# Patient Record
Sex: FEMALE | Race: WHITE | NOT HISPANIC OR LATINO | Employment: UNEMPLOYED | URBAN - METROPOLITAN AREA
[De-identification: names, ages, dates, MRNs, and addresses within clinical notes are randomized per-mention and may not be internally consistent; named-entity substitution may affect disease eponyms.]

---

## 2017-01-03 ENCOUNTER — OFFICE VISIT (OUTPATIENT)
Dept: MATERNAL FETAL MEDICINE | Facility: CLINIC | Age: 32
End: 2017-01-03
Payer: COMMERCIAL

## 2017-01-03 DIAGNOSIS — Z36.89 ENCOUNTER FOR ULTRASOUND TO CHECK FETAL GROWTH: ICD-10-CM

## 2017-01-03 PROCEDURE — 99499 UNLISTED E&M SERVICE: CPT | Mod: S$GLB,,, | Performed by: OBSTETRICS & GYNECOLOGY

## 2017-01-03 PROCEDURE — 76811 OB US DETAILED SNGL FETUS: CPT | Mod: S$GLB,,, | Performed by: OBSTETRICS & GYNECOLOGY

## 2017-01-03 NOTE — PROGRESS NOTES
Indication: follow up cosult: follow up growth (Dr. Patricia Silver).   Needs seq 2  hx of demise at 26w1d (11/2015).   ____________________________________________________________________________  History: Age: 31 years.  ____________________________________________________________________________  Dating:  Stated EDC:  EDC: 06/14/17 GA by stated EDC: 16w6d  Current Scan on: 01/03/17 EDC: 06/11/17 GA by current scan: 17w2d  Best Overall Assessment: 11/28/16 EDC: 06/14/17 Assessed GA: 16w6d  The calculation of the gestational age by current scan was based on BPD, OFD, HC, TCD, AC, FL and HUM.  The Best Overall Assessment is based on the stated EDC.  ____________________________________________________________________________  General Evaluation:  Fetal heart activity: present. Fetal heart rate: 156 bpm.   Presentation: cephalic.   Fetal movement: visible.   Amniotic Fluid: normal.   Cord: 3 vessels.   Placenta: posterior.     ____________________________________________________________________________  Growth Scan:  Zouna gestation.  Biometry:  BPD 37.0 mm 70th% 17w2d (16w6d to 17w5d)  .2 mm 70th% 17w3d (16w2d to 18w4d)  .7 mm 66th% 17w2d (16w5d to 17w6d)  FL 24.5 mm 67th% 17w3d (16w0d to 18w5d)  OFD 50.5 mm 88th% 17w4d  TCD 16.4 mm 51st% 16w6d  HUM 22.0 mm 54th% 17w0d  RLV 5.5 mm  CM 3.5 mm 27th%  NUCHAL FOLD 3.33 mm  EFW (lbs/oz) 0 lbs 7 ozs  EFW (g) 192 g  75th%       Fetal Anatomy:  Visualized with normal appearance: head, brain, neck, skin, chest, abdominal wall, gastrointestinal tract, kidneys, bladder.    Face: profile normal, nose normal, lip normal.  Spine: Sub-optimal  Heart: Visualized and normal appearance: four-chamber view, left outflow tract.   Angle: to the fetal left. Four-chamber view: septum is sub-opt. Left outflow tract: appears normal for this early EGA. Right outflow tract: sub-opt. Aortic arch: Not ascertained.  Extremities: 4 limbs. Plantar surface of the feet wnl, both hands  seen closed.    Summary of Ultrasound Findings:  Transabdominal US. U/S machine: PhilipsEPIQ 7W. U/S view: limited by fetal position, limited by fetal movements.     ____________________________________________________________________________  Consultation:  FUV: Prior 26w IUFD - TeleMed visit (Dr. Silver)    31  JAIMEE 19; 16w6d    Prenatal record and OB Hx reviewed  Dr. Torrez's last note reviewed  Ultrasound done  Maternal serum screen part I wnl  Interval pregnancy problems - none  No leaking, bleeding or abnormal discharge    EPIC reviewed    OB HX  10/2015 - 27w0d with IUFD at 26 weeks gestation.    She had a IUGR fetus which on autopsy and placental pathology documented advanced chorioamnionitis and fetal stress as evidenced by the presence of nucleated RBCs.   All cultures and genetic studies did not grow. The responsible organism was not identified.    ____________________________________________________________________________  Maternal Structures:  Cervical length 34.1 mm.  ____________________________________________________________________________  Report Summary:  Impression:   Normal fetal growth,   Fetal sono difficult secondary to early gestational age and fetal positioning,   No obvious fetal abnormalities,   Normal Amniotic fluid volume,   Normal placental location,   Normal cervical length.     Recommendations:   Sequential screen  Repeat sono at intervals per Dr. Torrez's note

## 2017-01-05 ENCOUNTER — TELEPHONE (OUTPATIENT)
Dept: MATERNAL FETAL MEDICINE | Facility: CLINIC | Age: 32
End: 2017-01-05

## 2017-01-05 NOTE — TELEPHONE ENCOUNTER
Patient was notified of Negative 2nd Trimester Screen for Down Syndrome,Trisomy 18 and ONTD and verbalized her understanding.

## 2017-01-31 ENCOUNTER — OFFICE VISIT (OUTPATIENT)
Dept: MATERNAL FETAL MEDICINE | Facility: CLINIC | Age: 32
End: 2017-01-31
Payer: COMMERCIAL

## 2017-01-31 VITALS — BODY MASS INDEX: 33.78 KG/M2 | WEIGHT: 203 LBS | DIASTOLIC BLOOD PRESSURE: 80 MMHG | SYSTOLIC BLOOD PRESSURE: 121 MMHG

## 2017-01-31 DIAGNOSIS — Z36.89 ENCOUNTER FOR FETAL ANATOMIC SURVEY: ICD-10-CM

## 2017-01-31 DIAGNOSIS — Z36.89 ENCOUNTER FOR ULTRASOUND TO CHECK FETAL GROWTH: Primary | ICD-10-CM

## 2017-01-31 PROCEDURE — 76811 OB US DETAILED SNGL FETUS: CPT | Mod: S$GLB,,, | Performed by: OBSTETRICS & GYNECOLOGY

## 2017-01-31 PROCEDURE — 99499 UNLISTED E&M SERVICE: CPT | Mod: S$GLB,,, | Performed by: OBSTETRICS & GYNECOLOGY

## 2017-01-31 NOTE — PROGRESS NOTES
A detailed fetal anatomical ultrasound was completed today.  See official report in the imaging section of [facility].  Ultrasound noted no obvious fetal abnormalities.  Ultrasound findings consistent with established dating.  Rescan as clinically indicated.

## 2017-02-27 ENCOUNTER — OFFICE VISIT (OUTPATIENT)
Dept: MATERNAL FETAL MEDICINE | Facility: CLINIC | Age: 32
End: 2017-02-27
Payer: COMMERCIAL

## 2017-02-27 DIAGNOSIS — Z36.89 ENCOUNTER FOR ULTRASOUND TO CHECK FETAL GROWTH: ICD-10-CM

## 2017-02-27 PROCEDURE — 99499 UNLISTED E&M SERVICE: CPT | Mod: S$GLB,,, | Performed by: OBSTETRICS & GYNECOLOGY

## 2017-02-27 PROCEDURE — 76816 OB US FOLLOW-UP PER FETUS: CPT | Mod: S$GLB,,, | Performed by: OBSTETRICS & GYNECOLOGY

## 2017-03-28 ENCOUNTER — OFFICE VISIT (OUTPATIENT)
Dept: MATERNAL FETAL MEDICINE | Facility: CLINIC | Age: 32
End: 2017-03-28
Payer: COMMERCIAL

## 2017-03-28 DIAGNOSIS — Z36.89 ENCOUNTER FOR ULTRASOUND TO CHECK FETAL GROWTH: ICD-10-CM

## 2017-03-28 PROCEDURE — 76819 FETAL BIOPHYS PROFIL W/O NST: CPT | Mod: S$GLB,,, | Performed by: OBSTETRICS & GYNECOLOGY

## 2017-03-28 PROCEDURE — 76815 OB US LIMITED FETUS(S): CPT | Mod: S$GLB,,, | Performed by: OBSTETRICS & GYNECOLOGY

## 2017-03-28 PROCEDURE — 99499 UNLISTED E&M SERVICE: CPT | Mod: S$GLB,,, | Performed by: OBSTETRICS & GYNECOLOGY

## 2017-04-25 ENCOUNTER — OFFICE VISIT (OUTPATIENT)
Dept: MATERNAL FETAL MEDICINE | Facility: CLINIC | Age: 32
End: 2017-04-25
Payer: COMMERCIAL

## 2017-04-25 DIAGNOSIS — Z36.89 ENCOUNTER FOR ULTRASOUND TO CHECK FETAL GROWTH: ICD-10-CM

## 2017-04-25 PROCEDURE — 99499 UNLISTED E&M SERVICE: CPT | Mod: S$GLB,,, | Performed by: OBSTETRICS & GYNECOLOGY

## 2017-04-25 PROCEDURE — 76816 OB US FOLLOW-UP PER FETUS: CPT | Mod: S$GLB,,, | Performed by: OBSTETRICS & GYNECOLOGY

## 2017-04-25 PROCEDURE — 76819 FETAL BIOPHYS PROFIL W/O NST: CPT | Mod: S$GLB,,, | Performed by: OBSTETRICS & GYNECOLOGY

## 2017-05-23 ENCOUNTER — OFFICE VISIT (OUTPATIENT)
Dept: MATERNAL FETAL MEDICINE | Facility: CLINIC | Age: 32
End: 2017-05-23
Payer: COMMERCIAL

## 2017-05-23 DIAGNOSIS — O09.299 PRIOR PREGNANCY WITH FETAL DEMISE: ICD-10-CM

## 2017-05-23 DIAGNOSIS — Z36.89 ENCOUNTER FOR ULTRASOUND TO CHECK FETAL GROWTH: ICD-10-CM

## 2017-05-23 PROCEDURE — 99499 UNLISTED E&M SERVICE: CPT | Mod: S$GLB,,, | Performed by: OBSTETRICS & GYNECOLOGY

## 2017-05-23 PROCEDURE — 76819 FETAL BIOPHYS PROFIL W/O NST: CPT | Mod: S$GLB,,, | Performed by: OBSTETRICS & GYNECOLOGY

## 2017-05-23 PROCEDURE — 76816 OB US FOLLOW-UP PER FETUS: CPT | Mod: S$GLB,,, | Performed by: OBSTETRICS & GYNECOLOGY

## 2017-05-23 NOTE — PROGRESS NOTES
"Indication  ========    Evaluation of fetal growth.    History  ======    General History  Height 165 cm  Height (ft) 5 ft  Height (in) 5 in  Other: OB: Dr. Patricia Silver  Previous Outcomes   3  Para 1  Abortions (A) 1  Stillbirths 1  Miscarriages 1  Risk Factors  History risk factors: Hx IUFD  Details: hx of demise at 26w1d (2015), SGA/fetal sepsis    Pregnancy History  ==============    Maternal Lab Tests  Test: Sequential Screen  Result: Part 2 negative (DS 1:10,000), age DSR 1:784  Wants to know gender: yes    Method  ======    Transabdominal ultrasound examination. View: Suboptimal view: limited by fetal position.    Pregnancy  =========    Ozuna pregnancy. Number of fetuses: 1.    Dating  ======    Cycle: regular cycle  GA by "stated dating" 36 w + 6 d  JAIMEE by "stated dating": 2017  Ultrasound examination on: 2017  GA by U/S based upon: AC, BPD, Femur, HC  GA by U/S 37 w + 5 d  JAIMEE by U/S: 2017  Assigned: The Best Overall Assessment is based on the stated EDC.  Assigned GA 36 w + 6 d  Assigned JAIMEE: 2017    General Evaluation  ==============    Cardiac activity: present.  bpm.  Fetal movements: visualized.  Presentation: cephalic.  Placenta:  Placental site: posterior.  Umbilical cord: Cord vessels: 3 vessel cord.  Amniotic fluid: Amount of AF: normal amount. MVP 4.3 cm.    Biophysical Profile  ==============    2: Fetal breathing movements  2: Gross body movements  2: Fetal tone  2: Amniotic fluid volume  : Biophysical profile score    Fetal Biometry  ============    Fetal Biometry  BPD 94.2 mm 38w 3d Hadlock  .1 mm  .0 mm 38w 0d Hadlock  .7 mm 38w 3d Hadlock  Femur 70.7 mm 36w 2d Hadlock  Humerus 63.7 mm 37w 0d Gopi  EFW 3,341 g 81% 38w 4d Hadlock  Calculated by: Hadlock (BPD-HC-AC-FL)  EFW (lb) 7 lb  EFW (oz) 6 oz  Cephalic index 0.82  HC / AC 0.96  FL / BPD 0.75  FL / AC 0.20  MVP 4.3 cm   bpm    Fetal " Anatomy  ============    Cranium: normal  Lateral ventricles: normal  Choroid plexus: documented previously  Midline falx: documented previously  Cavum septi pellucidi: normal  Cerebellum: documented previously  Cisterna magna: documented previously  Lips: documented previously  Profile: documented previously  Nose: documented previously  4-chamber view: documented previously  RVOT: documented previously  LVOT: documented previously  Situs: documented previously  Diaphragm: normal  Cord insertion: documented previously  Stomach: normal  Kidneys: normal  Bladder: normal  Genitals: documented previously  Cervical spine: documented previously  Thoracic spine: documented previously  Lumbar spine: documented previously  Sacral spine: documented previously  Arms: both visualized  Legs: both visualized  Gender: male  Wants to know gender: yes  Other: Right hand previously open. Left hand previously seen open    Impression  =========    Oz[place] live intrauterine pregnancy.  Overall normal fetal growth. Fetal AC measures ahead.  Amniotic fluid volume is normal.  BPP 8/8.  Limited anatomy appeared normal.    Recommendation  ==============    Continue antepartum fetal surveillance with twice weekly NSTs. I would recommend considering adding a weekly maximum vertical pocket  check of amniotic fluid in addition to the twice weekly NSTs.  Delivery at 39 weeks. Delivery earlier only if indicated.  Confirm normal GDM screening. Consider repeat screening if her screening is remote.

## 2020-02-08 ENCOUNTER — HOSPITAL ENCOUNTER (EMERGENCY)
Facility: HOSPITAL | Age: 35
Discharge: HOME | End: 2020-02-09
Attending: INTERNAL MEDICINE
Payer: COMMERCIAL

## 2020-02-08 VITALS
SYSTOLIC BLOOD PRESSURE: 109 MMHG | TEMPERATURE: 98 F | HEART RATE: 84 BPM | OXYGEN SATURATION: 97 % | HEIGHT: 65 IN | BODY MASS INDEX: 31.65 KG/M2 | RESPIRATION RATE: 18 BRPM | WEIGHT: 190 LBS | DIASTOLIC BLOOD PRESSURE: 71 MMHG

## 2020-02-08 DIAGNOSIS — N93.9 VAGINAL BLEEDING: ICD-10-CM

## 2020-02-08 DIAGNOSIS — Z34.91 NORMAL IUP (INTRAUTERINE PREGNANCY) ON PRENATAL ULTRASOUND, FIRST TRIMESTER: Primary | ICD-10-CM

## 2020-02-08 LAB
BILIRUB UR QL STRIP: NEGATIVE
CLARITY UR: CLEAR
COLOR UR: YELLOW
GLUCOSE UR QL STRIP: NEGATIVE
HGB UR QL STRIP: NEGATIVE
KETONES UR QL STRIP: NEGATIVE
LEUKOCYTE ESTERASE UR QL STRIP: NEGATIVE
NITRITE UR QL STRIP: NEGATIVE
PH UR STRIP: 7 [PH] (ref 5–8)
PROT UR QL STRIP: NEGATIVE
SP GR UR STRIP: 1.01 (ref 1–1.03)
URN SPEC COLLECT METH UR: NORMAL
UROBILINOGEN UR STRIP-ACNC: NEGATIVE EU/DL

## 2020-02-08 PROCEDURE — 76805 OB US >/= 14 WKS SNGL FETUS: CPT | Mod: 26,,, | Performed by: RADIOLOGY

## 2020-02-08 PROCEDURE — 76805 OB US >/= 14 WKS SNGL FETUS: CPT | Mod: TC

## 2020-02-08 PROCEDURE — 76805 US OB 14+ WEEKS, TRANSABDOM, SINGLE GESTATION: ICD-10-PCS | Mod: 26,,, | Performed by: RADIOLOGY

## 2020-02-08 PROCEDURE — 81003 URINALYSIS AUTO W/O SCOPE: CPT

## 2020-02-08 PROCEDURE — 99284 EMERGENCY DEPT VISIT MOD MDM: CPT | Mod: 25

## 2020-02-09 LAB
ALBUMIN SERPL BCP-MCNC: 3.4 G/DL (ref 3.5–5.2)
ALP SERPL-CCNC: 52 U/L (ref 55–135)
ALT SERPL W/O P-5'-P-CCNC: 12 U/L (ref 10–44)
ANION GAP SERPL CALC-SCNC: 7 MMOL/L (ref 8–16)
APTT BLDCRRT: 33.6 SEC (ref 21–32)
AST SERPL-CCNC: 17 U/L (ref 10–40)
BASOPHILS # BLD AUTO: 0.04 K/UL (ref 0–0.2)
BASOPHILS NFR BLD: 0.4 % (ref 0–1.9)
BILIRUB SERPL-MCNC: 0.2 MG/DL (ref 0.1–1)
BUN SERPL-MCNC: 11 MG/DL (ref 6–20)
CALCIUM SERPL-MCNC: 9.2 MG/DL (ref 8.7–10.5)
CHLORIDE SERPL-SCNC: 103 MMOL/L (ref 95–110)
CO2 SERPL-SCNC: 25 MMOL/L (ref 23–29)
CREAT SERPL-MCNC: 0.7 MG/DL (ref 0.5–1.4)
DIFFERENTIAL METHOD: ABNORMAL
EOSINOPHIL # BLD AUTO: 0.2 K/UL (ref 0–0.5)
EOSINOPHIL NFR BLD: 2 % (ref 0–8)
ERYTHROCYTE [DISTWIDTH] IN BLOOD BY AUTOMATED COUNT: 12.5 % (ref 11.5–14.5)
EST. GFR  (AFRICAN AMERICAN): >60 ML/MIN/1.73 M^2
EST. GFR  (NON AFRICAN AMERICAN): >60 ML/MIN/1.73 M^2
GLUCOSE SERPL-MCNC: 105 MG/DL (ref 70–110)
HCG INTACT+B SERPL-ACNC: NORMAL MIU/ML
HCT VFR BLD AUTO: 36.4 % (ref 37–48.5)
HGB BLD-MCNC: 12 G/DL (ref 12–16)
IMM GRANULOCYTES # BLD AUTO: 0.03 K/UL (ref 0–0.04)
IMM GRANULOCYTES NFR BLD AUTO: 0.3 % (ref 0–0.5)
INR PPP: 1.1 (ref 0.8–1.2)
LYMPHOCYTES # BLD AUTO: 3.2 K/UL (ref 1–4.8)
LYMPHOCYTES NFR BLD: 33.8 % (ref 18–48)
MCH RBC QN AUTO: 29.8 PG (ref 27–31)
MCHC RBC AUTO-ENTMCNC: 33 G/DL (ref 32–36)
MCV RBC AUTO: 90 FL (ref 82–98)
MONOCYTES # BLD AUTO: 0.8 K/UL (ref 0.3–1)
MONOCYTES NFR BLD: 8.1 % (ref 4–15)
NEUTROPHILS # BLD AUTO: 5.3 K/UL (ref 1.8–7.7)
NEUTROPHILS NFR BLD: 55.4 % (ref 38–73)
NRBC BLD-RTO: 0 /100 WBC
PLATELET # BLD AUTO: 309 K/UL (ref 150–350)
PMV BLD AUTO: 9.9 FL (ref 9.2–12.9)
POTASSIUM SERPL-SCNC: 4.2 MMOL/L (ref 3.5–5.1)
PROT SERPL-MCNC: 7.3 G/DL (ref 6–8.4)
PROTHROMBIN TIME: 12.5 SEC (ref 9–12.5)
RBC # BLD AUTO: 4.03 M/UL (ref 4–5.4)
SODIUM SERPL-SCNC: 135 MMOL/L (ref 136–145)
TSH SERPL DL<=0.005 MIU/L-ACNC: 1.79 UIU/ML (ref 0.34–5.6)
WBC # BLD AUTO: 9.53 K/UL (ref 3.9–12.7)

## 2020-02-09 PROCEDURE — 85730 THROMBOPLASTIN TIME PARTIAL: CPT

## 2020-02-09 PROCEDURE — 80053 COMPREHEN METABOLIC PANEL: CPT

## 2020-02-09 PROCEDURE — 85610 PROTHROMBIN TIME: CPT

## 2020-02-09 PROCEDURE — 85025 COMPLETE CBC W/AUTO DIFF WBC: CPT

## 2020-02-09 PROCEDURE — 84702 CHORIONIC GONADOTROPIN TEST: CPT

## 2020-02-09 PROCEDURE — 84443 ASSAY THYROID STIM HORMONE: CPT

## 2020-02-09 NOTE — DISCHARGE INSTRUCTIONS
Ultrasound revealed a viable intrauterine pregnancy.  Follow up with obstetrician within 2-3 days if no improvement or other questions  Continue prenatal vitamins.

## 2020-02-09 NOTE — ED TRIAGE NOTES
"PT to er c/o "Earlier today I just noticed a little bit of blood and I had a still born in the past so I just got very anxious and wanted to have it checked out. I'm not having any cramps or anything just a little bit of spotting. Maybe the size of a chanelle. It happened a few different times and only within the last hour and a half. I took two Tyelonol Sinus and Headache today."   "

## 2020-02-09 NOTE — ED PROVIDER NOTES
Encounter Date: 2/8/2020       History     Chief Complaint   Patient presents with    Vaginal Bleeding     15 weeks pregnant     Patient is a 34-year-old female that states that she has been spotting blood today.  Patient states she is 15+ weeks pregnant and has had miscarriage in the past.  Patient denies any cramping but states that her lower back is hurting.  Patient denies any fever chills nausea vomiting.  Patient denies any recent intercurrent illnesses.  Patient has a past medical history of fetal demise in previous pregnancy and miscarriage.  She is had a history of cervical dysplasia.        Review of patient's allergies indicates:  No Known Allergies  Past Medical History:   Diagnosis Date    Cervical dysplasia affecting pregnancy in second trimester 9/17/2015    History of fetal demise, not currently pregnant 12/18/2015    HSIL on Pap smear of cervix 7/28/2015    Miscarriage     Severe cervical dysplasia 12/18/2015     Past Surgical History:   Procedure Laterality Date    CERVICAL BIOPSY  12/2015    cone    DILATION AND CURETTAGE OF UTERUS       Family History   Problem Relation Age of Onset    Breast cancer Other     Breast cancer Other     Thyroid disease Mother     Diabetes Maternal Grandmother     Bone cancer Maternal Grandmother     Diabetes Maternal Grandfather     Rectal cancer Maternal Grandfather     Colon cancer Maternal Grandfather         rectal    Breast cancer Paternal Aunt 49    Ovarian cancer Neg Hx     Uterine cancer Neg Hx      Social History     Tobacco Use    Smoking status: Former Smoker    Smokeless tobacco: Never Used   Substance Use Topics    Alcohol use: No     Alcohol/week: 0.0 standard drinks    Drug use: No     Review of Systems   Constitutional: Negative for activity change, appetite change, fatigue and fever.   HENT: Negative for congestion, ear discharge, mouth sores, nosebleeds, rhinorrhea, sinus pressure, sinus pain and tinnitus.    Eyes: Negative.   Negative for pain, redness and itching.   Respiratory: Negative for apnea, cough, choking, chest tightness, shortness of breath, wheezing and stridor.    Cardiovascular: Negative for chest pain, palpitations and leg swelling.   Gastrointestinal: Negative for abdominal distention, abdominal pain, anal bleeding, blood in stool, constipation and diarrhea.   Endocrine: Negative.    Genitourinary: Positive for frequency and vaginal bleeding. Negative for difficulty urinating, flank pain and urgency.   Musculoskeletal: Positive for back pain. Negative for arthralgias, gait problem and myalgias.   Skin: Negative for color change and pallor.   Allergic/Immunologic: Negative.    Neurological: Negative for dizziness, facial asymmetry, weakness, light-headedness and headaches.   Hematological: Negative for adenopathy. Does not bruise/bleed easily.   Psychiatric/Behavioral: The patient is nervous/anxious.        Physical Exam     Initial Vitals [02/08/20 2234]   BP Pulse Resp Temp SpO2   109/71 84 18 97.9 °F (36.6 °C) 97 %      MAP       --         Physical Exam    Nursing note and vitals reviewed.  Constitutional: She appears well-developed and well-nourished.   HENT:   Head: Atraumatic.   Eyes: EOM are normal. Pupils are equal, round, and reactive to light.   Neck: Normal range of motion. Neck supple.   Cardiovascular: Normal rate, regular rhythm, normal heart sounds and intact distal pulses.   Pulmonary/Chest: Breath sounds normal.   Abdominal: Soft.   Genitourinary: Vagina normal and uterus normal.   Musculoskeletal: Normal range of motion.   Neurological: She is alert and oriented to person, place, and time. GCS score is 15. GCS eye subscore is 4. GCS verbal subscore is 5. GCS motor subscore is 6.   Skin: Skin is warm and dry.   Psychiatric: She has a normal mood and affect.         ED Course   Procedures  Labs Reviewed   URINALYSIS, REFLEX TO URINE CULTURE    Narrative:     Preferred Collection Type->Urine, Clean Catch    CBC W/ AUTO DIFFERENTIAL   COMPREHENSIVE METABOLIC PANEL   HCG, QUANTITATIVE, PREGNANCY   APTT   PROTIME-INR   TSH          Imaging Results          US OB 14+ Wks, TransAbd, Single Gestation (In process)  Result time 02/08/20 23:30:25              X-Rays:   Independently Interpreted Readings:   Other Readings:  Pelvic ultrasound for Ob revealed a viable intrauterine pregnancy approximately 15 weeks.  No other abnormalities noted.                      ED Course as of Feb 09 0540   Sun Feb 09, 2020   0053 Patient with ultrasound showing viable and true uterine pregnancy.    [JK]   0055 Patient advisable findings and advised to continue prenatal    [JK]   0056  vitamins and follow up with primary care physician or obstetrician in 2-3 days if further questions or worsening bleeding    [JK]      ED Course User Index  [JK] Armin Dixon MD                Clinical Impression:       ICD-10-CM ICD-9-CM   1. Normal IUP (intrauterine pregnancy) on prenatal ultrasound, first trimester Z34.91 V22.2   2. Vaginal bleeding N93.9 623.8                             Armin Dixon MD  02/09/20 0052       Armin Dixon MD  02/09/20 0540

## 2020-03-02 ENCOUNTER — HOSPITAL ENCOUNTER (EMERGENCY)
Facility: HOSPITAL | Age: 35
Discharge: HOME | End: 2020-03-02
Attending: FAMILY MEDICINE
Payer: COMMERCIAL

## 2020-03-02 VITALS
SYSTOLIC BLOOD PRESSURE: 115 MMHG | TEMPERATURE: 99 F | HEIGHT: 65 IN | RESPIRATION RATE: 20 BRPM | WEIGHT: 213 LBS | BODY MASS INDEX: 35.49 KG/M2 | DIASTOLIC BLOOD PRESSURE: 77 MMHG | OXYGEN SATURATION: 98 % | HEART RATE: 78 BPM

## 2020-03-02 DIAGNOSIS — R58 BLEEDING: ICD-10-CM

## 2020-03-02 DIAGNOSIS — O46.90 VAGINAL BLEEDING IN PREGNANCY: Primary | ICD-10-CM

## 2020-03-02 PROCEDURE — 76805 OB US >/= 14 WKS SNGL FETUS: CPT | Mod: TC

## 2020-03-02 PROCEDURE — 99284 EMERGENCY DEPT VISIT MOD MDM: CPT | Mod: 25

## 2020-03-02 PROCEDURE — 76805 OB US >/= 14 WKS SNGL FETUS: CPT | Mod: 26,,, | Performed by: RADIOLOGY

## 2020-03-02 PROCEDURE — 76805 US OB 14+ WEEKS, TRANSABDOM, SINGLE GESTATION: ICD-10-PCS | Mod: 26,,, | Performed by: RADIOLOGY

## 2020-03-02 NOTE — ED TRIAGE NOTES
"Present to the ER with c/o " spotting i'm nineteen weeks pregnant" reports spotting this am at approx 0830, described as " pink red" vaginal spotting is when wiping self, denies abd cramping, denies pain, G 2, P1, stillborn 1, currently EDC 07/28/20  "

## 2020-03-03 NOTE — ED PROVIDER NOTES
Encounter Date: 3/2/2020       History     Chief Complaint   Patient presents with    Vaginal Bleeding     19wks pregnant, OB in [place] told pt to come to ED.     34-year-old female presents ambulatory complaining of some vaginal spotting it was dark red she is approximately 20 weeks IUP and is concerned because she had a fetal demise at 25 weeks at her last pregnancy which was apparently unexplained, the patient did have an ultrasound approximately 1-2 weeks ago which showed normal pregnancy she denies any nausea vomiting diarrhea no history of trauma dysuria fever chills or abdominal pain her OBGYN is in [facility]        Review of patient's allergies indicates:  No Known Allergies  Past Medical History:   Diagnosis Date    Cervical dysplasia affecting pregnancy in second trimester 9/17/2015    History of fetal demise, not currently pregnant 12/18/2015    HSIL on Pap smear of cervix 7/28/2015    Miscarriage     Severe cervical dysplasia 12/18/2015     Past Surgical History:   Procedure Laterality Date    CERVICAL BIOPSY  12/2015    cone    DILATION AND CURETTAGE OF UTERUS       Family History   Problem Relation Age of Onset    Breast cancer Other     Breast cancer Other     Thyroid disease Mother     Diabetes Maternal Grandmother     Bone cancer Maternal Grandmother     Diabetes Maternal Grandfather     Rectal cancer Maternal Grandfather     Colon cancer Maternal Grandfather         rectal    Breast cancer Paternal Aunt 49    Ovarian cancer Neg Hx     Uterine cancer Neg Hx      Social History     Tobacco Use    Smoking status: Former Smoker    Smokeless tobacco: Never Used   Substance Use Topics    Alcohol use: No     Alcohol/week: 0.0 standard drinks    Drug use: No     Review of Systems   Constitutional: Negative for fever.   HENT: Negative for sore throat.    Respiratory: Negative for shortness of breath.    Cardiovascular: Negative for chest pain.   Gastrointestinal: Negative for  nausea.   Genitourinary: Negative for dysuria.   Musculoskeletal: Negative for back pain.   Skin: Negative for rash.   Neurological: Negative for weakness.   Hematological: Does not bruise/bleed easily.       Physical Exam     Initial Vitals [03/02/20 0951]   BP Pulse Resp Temp SpO2   107/65 85 16 98.6 °F (37 °C) 97 %      MAP       --         Physical Exam    Nursing note and vitals reviewed.  Constitutional: She appears well-developed and well-nourished. She is not diaphoretic. No distress.   HENT:   Head: Normocephalic and atraumatic.   Right Ear: External ear normal.   Left Ear: External ear normal.   Eyes: Pupils are equal, round, and reactive to light. Right eye exhibits no discharge. Left eye exhibits no discharge.   Neck: No tracheal deviation present. No JVD present.   Cardiovascular: Exam reveals no friction rub.    No murmur heard.  Pulmonary/Chest: No stridor. No respiratory distress. She has no wheezes. She has no rales.   Abdominal: Bowel sounds are normal. She exhibits no distension.   Musculoskeletal: Normal range of motion.   Neurological: She is alert.   Skin: Skin is warm.   Psychiatric: She has a normal mood and affect.         ED Course   Procedures  Labs Reviewed - No data to display       Imaging Results          US OB 14+ Wks, TransAbd, Single Gestation (Final result)  Result time 03/02/20 11:02:57    Final result by Lucio Newton MD (03/02/20 11:02:57)                 Impression:      Single viable intrauterine pregnancy.    Amniotic fluid volume is within normal limits and there is no evidence of placenta previa.      Electronically signed by: Lucio Newton  Date:    03/02/2020  Time:    11:02             Narrative:    EXAMINATION:  US OB 14+ WEEKS, TRANSABDOM, SINGLE GESTATION    CLINICAL HISTORY:  Hemorrhage, not elsewhere classified    TECHNIQUE:  Limited transabdominal obstetrical ultrasound was performed.    COMPARISON:  Ultrasound 02/08/2020.    FINDINGS:  There is a single,  viable intrauterine gestation in cephalic presentation.  Fetal heart tones are detected and regular 151 beats per minute.  The placenta is posterior in location.  The cervix is identified and closed measuring 3.1 cm.    Normal fetal movement is detected.  Amniotic fluid index is normal at 11.9 cc.                                 Medical Decision Making:   Ultrasound result discussed with the patient normal viable fetus with no evidence of placenta previa normal amount of amniotic fluid                                 Clinical Impression:       ICD-10-CM ICD-9-CM   1. Vaginal bleeding in pregnancy O46.90 641.93   2. Bleeding R58 459.0             ED Disposition Condition    Discharge Stable        ED Prescriptions     None        Follow-up Information    None                                    Bob Emanuel MD  03/05/20 5498

## 2020-03-05 DIAGNOSIS — Z36.89 ENCOUNTER FOR FETAL ANATOMIC SURVEY: ICD-10-CM

## 2020-03-05 DIAGNOSIS — Z87.59 HISTORY OF FETAL DEMISE, NOT CURRENTLY PREGNANT: Primary | ICD-10-CM

## 2020-03-10 ENCOUNTER — TELEPHONE (OUTPATIENT)
Dept: MATERNAL FETAL MEDICINE | Facility: CLINIC | Age: 35
End: 2020-03-10

## 2020-03-11 ENCOUNTER — PROCEDURE VISIT (OUTPATIENT)
Dept: MATERNAL FETAL MEDICINE | Facility: CLINIC | Age: 35
End: 2020-03-11
Payer: COMMERCIAL

## 2020-03-11 VITALS
WEIGHT: 216 LBS | HEIGHT: 65 IN | BODY MASS INDEX: 35.99 KG/M2 | DIASTOLIC BLOOD PRESSURE: 69 MMHG | SYSTOLIC BLOOD PRESSURE: 114 MMHG

## 2020-03-11 DIAGNOSIS — Z36.89 ENCOUNTER FOR FETAL ANATOMIC SURVEY: ICD-10-CM

## 2020-03-11 DIAGNOSIS — Z87.59 HISTORY OF FETAL DEMISE, NOT CURRENTLY PREGNANT: ICD-10-CM

## 2020-03-11 PROCEDURE — 76811 OB US DETAILED SNGL FETUS: CPT | Mod: ,,, | Performed by: OBSTETRICS & GYNECOLOGY

## 2020-03-11 PROCEDURE — 76811 PR US, OB FETAL EVAL & EXAM, TRANSABDOM,FIRST GESTATION: ICD-10-PCS | Mod: ,,, | Performed by: OBSTETRICS & GYNECOLOGY

## 2020-03-11 PROCEDURE — 99202 OFFICE O/P NEW SF 15 MIN: CPT | Mod: 25,,, | Performed by: OBSTETRICS & GYNECOLOGY

## 2020-03-11 PROCEDURE — 99202 PR OFFICE/OUTPT VISIT, NEW, LEVL II, 15-29 MIN: ICD-10-PCS | Mod: 25,,, | Performed by: OBSTETRICS & GYNECOLOGY

## 2020-03-11 NOTE — PROGRESS NOTES
Chief complaint: Prior 26 week IUFD    Provider requesting consultation: Dr. Silver    34 y.o. [id number] 20w1d EGA    PMH:  Past Medical History:   Diagnosis Date    Cervical dysplasia affecting pregnancy in second trimester 2015    History of fetal demise, not currently pregnant 2015    HSIL on Pap smear of cervix 2015    Miscarriage     Severe cervical dysplasia 2015       PObHx:  OB History    Para Term  AB Living   4 1   1 1 0   SAB TAB Ectopic Multiple Live Births   1       1      # Outcome Date GA Lbr Syed/2nd Weight Sex Delivery Anes PTL Lv   4 Current            3  10/2015 27w0d   F    ND   2 SAB  7w0d          1                 PSH:  Past Surgical History:   Procedure Laterality Date    CERVICAL BIOPSY  2015    cone    DILATION AND CURETTAGE OF UTERUS         Family history:family history includes Bone cancer in her maternal grandmother; Breast cancer in her other and other; Breast cancer (age of onset: 49) in her paternal aunt; Colon cancer in her maternal grandfather; Diabetes in her maternal grandfather and maternal grandmother; Rectal cancer in her maternal grandfather; Thyroid disease in her mother.    Social history: reports that she has quit smoking. She has never used smokeless tobacco. She reports that she does not drink alcohol or use drugs.    A detailed fetal anatomical ultrasound was completed today.  See details in imaging section of [facility].    The patient was referred by Dr. Dexter due to her prior history of IUFD at 26 weeks.  Consults for this were performed in her prior pregnancy.  Please refer back to these for a full discussion.  She had a chorioamnionitis/funisitis resulting in demise. Her subsequent pregnancy was uneventful.  The risk of a recurrent demise is small.  She did have questions regarding fetal movement as she is understandably anxious.  I explained the concept of fetal sleep cycles and activity cycles.  She was  cautioned to present if she felt prolonged periods (6-8 hours) without activity. I stated that in this previable period active intervention would not be warranted unless a treatable condition was identified.    The patient was given an opportunity to ask questions about management and the diease process.  She expressed an understanding of and agreement to the above impression and plan. All questions were answered to her satisfaction.  She was given contact information to the [facility] clinic to address further concerns.      The approximate physician face-to-face time was 15 minutes. The majority of the time (>50%) was spent on counseling of the patient or coordination of care.

## 2021-10-06 ENCOUNTER — OFFICE VISIT (OUTPATIENT)
Dept: INTERNAL MEDICINE | Facility: CLINIC | Age: 36
End: 2021-10-06
Payer: COMMERCIAL

## 2021-10-06 VITALS
RESPIRATION RATE: 18 BRPM | SYSTOLIC BLOOD PRESSURE: 122 MMHG | HEART RATE: 101 BPM | DIASTOLIC BLOOD PRESSURE: 78 MMHG | BODY MASS INDEX: 31.98 KG/M2 | OXYGEN SATURATION: 97 % | WEIGHT: 199 LBS | HEIGHT: 66 IN

## 2021-10-06 DIAGNOSIS — Z80.8 FAMILY HISTORY OF BONE CANCER: ICD-10-CM

## 2021-10-06 DIAGNOSIS — R23.8 OTHER SKIN CHANGES: Primary | ICD-10-CM

## 2021-10-06 PROCEDURE — 3008F BODY MASS INDEX DOCD: CPT | Mod: S$GLB,,, | Performed by: NURSE PRACTITIONER

## 2021-10-06 PROCEDURE — 3074F SYST BP LT 130 MM HG: CPT | Mod: S$GLB,,, | Performed by: NURSE PRACTITIONER

## 2021-10-06 PROCEDURE — 3078F DIAST BP <80 MM HG: CPT | Mod: S$GLB,,, | Performed by: NURSE PRACTITIONER

## 2021-10-06 PROCEDURE — 1159F PR MEDICATION LIST DOCUMENTED IN MEDICAL RECORD: ICD-10-PCS | Mod: S$GLB,,, | Performed by: NURSE PRACTITIONER

## 2021-10-06 PROCEDURE — 99999 PR PBB SHADOW E&M-EST. PATIENT-LVL III: ICD-10-PCS | Mod: PBBFAC,,, | Performed by: NURSE PRACTITIONER

## 2021-10-06 PROCEDURE — 3078F PR MOST RECENT DIASTOLIC BLOOD PRESSURE < 80 MM HG: ICD-10-PCS | Mod: S$GLB,,, | Performed by: NURSE PRACTITIONER

## 2021-10-06 PROCEDURE — 99203 OFFICE O/P NEW LOW 30 MIN: CPT | Mod: S$GLB,,, | Performed by: NURSE PRACTITIONER

## 2021-10-06 PROCEDURE — 99203 PR OFFICE/OUTPT VISIT, NEW, LEVL III, 30-44 MIN: ICD-10-PCS | Mod: S$GLB,,, | Performed by: NURSE PRACTITIONER

## 2021-10-06 PROCEDURE — 3008F PR BODY MASS INDEX (BMI) DOCUMENTED: ICD-10-PCS | Mod: S$GLB,,, | Performed by: NURSE PRACTITIONER

## 2021-10-06 PROCEDURE — 1159F MED LIST DOCD IN RCRD: CPT | Mod: S$GLB,,, | Performed by: NURSE PRACTITIONER

## 2021-10-06 PROCEDURE — 3074F PR MOST RECENT SYSTOLIC BLOOD PRESSURE < 130 MM HG: ICD-10-PCS | Mod: S$GLB,,, | Performed by: NURSE PRACTITIONER

## 2021-10-06 PROCEDURE — 99999 PR PBB SHADOW E&M-EST. PATIENT-LVL III: CPT | Mod: PBBFAC,,, | Performed by: NURSE PRACTITIONER

## 2021-10-11 ENCOUNTER — HOSPITAL ENCOUNTER (OUTPATIENT)
Dept: RADIOLOGY | Facility: HOSPITAL | Age: 36
Discharge: HOME | End: 2021-10-11
Attending: NURSE PRACTITIONER
Payer: COMMERCIAL

## 2021-10-11 DIAGNOSIS — R23.8 OTHER SKIN CHANGES: ICD-10-CM

## 2021-10-11 PROCEDURE — 73552 X-RAY EXAM OF FEMUR 2/>: CPT | Mod: 26,LT,, | Performed by: RADIOLOGY

## 2021-10-11 PROCEDURE — 73552 X-RAY EXAM OF FEMUR 2/>: CPT | Mod: TC,PN,LT

## 2021-10-11 PROCEDURE — 73502 X-RAY EXAM HIP UNI 2-3 VIEWS: CPT | Mod: 26,LT,, | Performed by: RADIOLOGY

## 2021-10-11 PROCEDURE — 73502 X-RAY EXAM HIP UNI 2-3 VIEWS: CPT | Mod: TC,PN,LT

## 2021-10-11 PROCEDURE — 73502 XR PELVIS 3 VIEW INC HIP 2 VIEW LEFT: ICD-10-PCS | Mod: 26,LT,, | Performed by: RADIOLOGY

## 2021-10-11 PROCEDURE — 73552 XR FEMUR 2 VIEW LEFT: ICD-10-PCS | Mod: 26,LT,, | Performed by: RADIOLOGY

## 2024-10-07 ENCOUNTER — HOSPITAL ENCOUNTER (OUTPATIENT)
Dept: RADIOLOGY | Facility: HOSPITAL | Age: 39
Discharge: HOME | End: 2024-10-07
Attending: NURSE PRACTITIONER
Payer: COMMERCIAL

## 2024-10-07 DIAGNOSIS — M25.561 RIGHT KNEE PAIN: ICD-10-CM

## 2024-10-07 DIAGNOSIS — M25.561 RIGHT KNEE PAIN: Primary | ICD-10-CM

## 2024-10-07 PROCEDURE — 73564 X-RAY EXAM KNEE 4 OR MORE: CPT | Mod: TC,RT

## 2024-10-07 PROCEDURE — 73564 X-RAY EXAM KNEE 4 OR MORE: CPT | Mod: 26,RT,, | Performed by: RADIOLOGY

## 2025-07-25 ENCOUNTER — HOSPITAL ENCOUNTER (EMERGENCY)
Facility: HOSPITAL | Age: 40
Discharge: HOME | End: 2025-07-25
Attending: EMERGENCY MEDICINE
Payer: COMMERCIAL

## 2025-07-25 VITALS
BODY MASS INDEX: 37.99 KG/M2 | WEIGHT: 228 LBS | TEMPERATURE: 98 F | HEART RATE: 73 BPM | RESPIRATION RATE: 20 BRPM | SYSTOLIC BLOOD PRESSURE: 126 MMHG | HEIGHT: 65 IN | DIASTOLIC BLOOD PRESSURE: 61 MMHG | OXYGEN SATURATION: 97 %
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
## 2025-07-25 DIAGNOSIS — T14.90XA TRAUMA: ICD-10-CM

## 2025-07-25 PROCEDURE — 99284 EMERGENCY DEPT VISIT MOD MDM: CPT | Mod: 25

## 2025-07-25 PROCEDURE — 76815 OB US LIMITED FETUS(S): CPT | Mod: TC

## 2025-07-25 PROCEDURE — 94760 N-INVAS EAR/PLS OXIMETRY 1: CPT

## 2025-07-25 PROCEDURE — 76815 OB US LIMITED FETUS(S): CPT | Mod: 26,,, | Performed by: RADIOLOGY

## 2025-07-25 RX ORDER — OMEPRAZOLE 20 MG/1
20 TABLET, DELAYED RELEASE ORAL 2 TIMES DAILY
COMMUNITY

## 2025-07-25 RX ORDER — CITALOPRAM 10 MG/1
10 TABLET ORAL DAILY
COMMUNITY

## 2025-07-26 NOTE — DISCHARGE INSTRUCTIONS
You may take Tylenol for pain.  Follow up with your obstetrician.  Return to the emergency room if any further problems.

## 2025-07-26 NOTE — ED NOTES
Pt reports she was going down the steps and they were wet causing her to slip and fall landing on her tailbone area. Pt reports she did not hit her head or have LOC. Pt reports she is concerned about her baby as she is 23w pregnant. Pt reports that she is having no abd cramping, pain or discomfort. Pt denies any vaginal bleeding. Pt denies n/v/d. Pt reports she has felt the baby move several times since the fall. Pt is noted anxious and worried but calm. Pt reports this is her 3rd pregnancy. FHT was assessed with small doppler and could  FHT reading of 130-194 bpm however could not hear the HT. RN could catch a second of hearing the HT's and then they would disappear ever though could visualize the HT's on the doppler screen. Mom's HR could be heard and seen at 62-77 BPM. Pt verbalized feeling baby move while RN was attempting to assess FHT's. RN notified ER Provider and that this assessment notably made pt more anxious. ER Provider reported he had ordered a US and that the [employer] tech had been called in to do US. RN updated pt on US being called in to obtain US. Pt changing into a gown and denies needs of anything else.

## 2025-07-26 NOTE — ED PROVIDER NOTES
Encounter Date: 7/25/2025       History     Chief Complaint   Patient presents with    Fall     Patient is 23 weeks and 5 days pregnant. Reports slipped and fell onto her buttocks while walking down steps, reports she hit her back on the steps when she fell but denies hitting her abdomen, reports feeling the baby moves since the fall, denies vaginal bleeding, abdominal pain, or contractions     Patient presents to the emergency department for evaluation of fall.  Patient said she slipped going down her steps and landed on her buttocks and then on her back.  She states she has soreness across her lower back.  Patient is 23 weeks pregnant.  She denies vaginal bleeding.  She has had no evidence of ruptured BOW.  She denies abdominal pain.  She has had no nausea or vomiting.  She denies any numbness or tingling anywhere.  Patient denies any other complaints.    The history is provided by the patient.     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 7/28/2015    Miscarriage     Severe cervical dysplasia 12/18/2015     Past Surgical History:   Procedure Laterality Date    CERVICAL BIOPSY  12/2015    cone    DILATION AND CURETTAGE OF UTERUS       Family History   Problem Relation Name Age of Onset    Breast cancer Other MGGM     Breast cancer Other MGA     Thyroid disease Mother      Diabetes Maternal Grandmother      Bone cancer Maternal Grandmother      Diabetes Maternal Grandfather      Rectal cancer Maternal Grandfather      Colon cancer Maternal Grandfather          rectal    Breast cancer Paternal Aunt  49    Ovarian cancer Neg Hx      Uterine cancer Neg Hx       Social History[1]  Review of Systems   Constitutional:  Negative for activity change, appetite change, chills and fever.   HENT:  Negative  for congestion, ear discharge, ear pain, nosebleeds, sore throat and voice change.    Eyes:  Negative for photophobia, pain and discharge.   Respiratory:  Negative for cough, chest tightness, shortness of breath and wheezing.    Cardiovascular:  Negative for chest pain and palpitations.   Gastrointestinal:  Negative for abdominal pain, constipation, nausea and vomiting.   Genitourinary:  Negative for dysuria, flank pain, frequency and urgency.   Musculoskeletal:  Positive for back pain. Negative for neck pain.   Skin:  Negative for rash and wound.   Neurological:  Negative for dizziness, seizures, weakness and headaches.   All other systems reviewed and are negative.      Physical Exam     Initial Vitals [07/25/25 1943]   BP Pulse Resp Temp SpO2   (!) 112/56 76 20 98.2 °F (36.8 °C) 97 %      MAP       --         Physical Exam    Nursing note and vitals reviewed.  Constitutional: She appears well-developed and well-nourished.   HENT:   Head: Normocephalic and atraumatic.   Right Ear: External ear normal.   Left Ear: External ear normal.   Nose: Nose normal. Mouth/Throat: Oropharynx is clear and moist.   Eyes: Conjunctivae and EOM are normal. Pupils are equal, round, and reactive to light.   Neck: Neck supple. No tracheal deviation present.   Normal range of motion.  Cardiovascular:  Normal rate, regular rhythm and normal heart sounds.           Pulmonary/Chest: Breath sounds normal. She has no wheezes.   Abdominal: Abdomen is soft. Bowel sounds are normal. There is no abdominal tenderness.   Musculoskeletal:         General: No tenderness. Normal range of motion.      Cervical back: Normal range of motion and neck supple.     Neurological: She is alert and oriented to person, place, and time. She has normal strength and normal reflexes. No sensory deficit. GCS score is 15. GCS eye subscore is 4. GCS verbal subscore is 5. GCS motor subscore is 6.   Patient denies any numbness.  She has no perineal numbness.  She  denies any loss of bowel or bladder control.   Skin: Skin is warm and dry. Capillary refill takes less than 2 seconds. No rash noted.         ED Course   Procedures  Labs Reviewed - No data to display       Imaging Results              US OB Limited 1 Or More Gestations (In process)  Result time 07/25/25 21:39:52   Procedure changed from US OB 14+ Wks, TransAbd, Single Gestation                    Medications - No data to display  Medical Decision Making  History is obtained with the patient.  Social determinants of healthcare were considered.  Patient has a insurance and a primary care provider and no decreased access to healthcare.  Patient is currently has no vaginal bleeding.  She has no leaking fluid.  We will get FHTs and an ultrasound to make sure the baby as well.    Fetal heart tones were in the 150s.  OB ultrasound confirms FHTs of 150 with good fetal movement and appropriate amount of amniotic fluid.  There is no evidence of placental abruption.  We will discharge patient home.    Amount and/or Complexity of Data Reviewed  Radiology: ordered.                                          Clinical Impression:  Final diagnoses:  [T14.90XA] Trauma  [W19.XXXA] Fall, initial encounter (Primary)          ED Disposition Condition    Discharge Stable          ED Prescriptions    None       Follow-up Information       Follow up With Specialties Details Why Contact Info    Obstetrician of choice  In 3 days                     [1]   Social History  Tobacco Use    Smoking status: Former    Smokeless tobacco: Never   Substance Use Topics    Alcohol use: No     Alcohol/week: 0.0 standard drinks of alcohol    Drug use: No        Tay Kessler, DO  07/25/25 [id number]